# Patient Record
Sex: MALE | Race: WHITE | NOT HISPANIC OR LATINO | URBAN - METROPOLITAN AREA
[De-identification: names, ages, dates, MRNs, and addresses within clinical notes are randomized per-mention and may not be internally consistent; named-entity substitution may affect disease eponyms.]

---

## 2020-01-09 ENCOUNTER — OUTPATIENT (OUTPATIENT)
Dept: OUTPATIENT SERVICES | Facility: HOSPITAL | Age: 70
LOS: 1 days | End: 2020-01-09
Payer: COMMERCIAL

## 2020-01-09 ENCOUNTER — APPOINTMENT (OUTPATIENT)
Dept: RADIOLOGY | Facility: HOSPITAL | Age: 70
End: 2020-01-09
Payer: MEDICARE

## 2020-01-09 DIAGNOSIS — Z00.8 ENCOUNTER FOR OTHER GENERAL EXAMINATION: ICD-10-CM

## 2020-01-09 PROCEDURE — 71046 X-RAY EXAM CHEST 2 VIEWS: CPT | Mod: 26

## 2020-01-09 PROCEDURE — 71046 X-RAY EXAM CHEST 2 VIEWS: CPT

## 2020-05-20 ENCOUNTER — TRANSCRIPTION ENCOUNTER (OUTPATIENT)
Age: 70
End: 2020-05-20

## 2020-08-03 ENCOUNTER — TRANSCRIPTION ENCOUNTER (OUTPATIENT)
Age: 70
End: 2020-08-03

## 2020-09-15 ENCOUNTER — TRANSCRIPTION ENCOUNTER (OUTPATIENT)
Age: 70
End: 2020-09-15

## 2021-04-27 ENCOUNTER — APPOINTMENT (OUTPATIENT)
Dept: NEUROLOGY | Facility: CLINIC | Age: 71
End: 2021-04-27
Payer: COMMERCIAL

## 2021-04-27 VITALS — DIASTOLIC BLOOD PRESSURE: 68 MMHG | SYSTOLIC BLOOD PRESSURE: 116 MMHG | HEART RATE: 78 BPM

## 2021-04-27 DIAGNOSIS — F03.90 UNSPECIFIED DEMENTIA W/OUT BEHAVIORAL DISTURBANCE: ICD-10-CM

## 2021-04-27 DIAGNOSIS — Z82.0 FAMILY HISTORY OF EPILEPSY AND OTHER DISEASES OF THE NERVOUS SYSTEM: ICD-10-CM

## 2021-04-27 DIAGNOSIS — Z72.89 OTHER PROBLEMS RELATED TO LIFESTYLE: ICD-10-CM

## 2021-04-27 DIAGNOSIS — Z78.9 OTHER SPECIFIED HEALTH STATUS: ICD-10-CM

## 2021-04-27 PROCEDURE — 99072 ADDL SUPL MATRL&STAF TM PHE: CPT

## 2021-04-27 PROCEDURE — 99205 OFFICE O/P NEW HI 60 MIN: CPT

## 2021-04-27 NOTE — PHYSICAL EXAM
[FreeTextEntry1] : Head:  Normocephalic Neck: Supple nontender no carotid bruits.  Spine:  Nontender negative straight leg raising.\par \par Mental Status:  Alert Oriented X3 Speech normal and no aphasia or dysarthria.  He scores 30 out of 30 on the Mini-Mental state exam.\par \par Cranial Nerves:  PERRL, Fundi normal Visual Fields full  EOMI no diplopia no ptosis no nystagmus, V through XII intact.\par \par Motor: There bilateral high foot arch is more prominent the left foot and there is mild weakness of left foot dorsiflexion and extensor hallucis.  There is some atrophy of the left anterior tibial muscle group.  There is no dysmetria no fasciculations.\par \par DTRs: Symmetric absent ankle jerks.  Plantars flexor.  No Clonus.\par \par Sensory: Mild decreased pinprick and vibration sense distally in both feet.\par \par Gait: Regular and he is able to walk on his heels and toes.  There is unsteadiness and tandem walking any sways in Romberg.\par

## 2021-04-27 NOTE — REASON FOR VISIT
[Initial Evaluation] : an initial evaluation [FreeTextEntry1] : Numbness and tingling in both feet and family history of Alzheimer's disease.

## 2021-04-27 NOTE — HISTORY OF PRESENT ILLNESS
[FreeTextEntry1] : Mr. Jose Diane is seen for office consultation.  He is a 71-year-old right-handed gentleman who presents with an approximate 6-month history of numbness and tingling in both feet.  Symptoms have become more prominent.  He does have a chronic history of lower back pain he has had episodes of radiculopathy in the lower extremities.  Does not recall having a lumbar MRI.  Is not having any radiating pain into the lower limbs at this time.  He denies motor weakness.  He notices some mild imbalance during the last 6 months.  He denies any bowel or bladder problems.  He is not a diabetic.  He does have nighttime leg cramps.\par \par Also this patient reports a family history of Alzheimer's disease in his father with onset in mid 70s and brother who was diagnosed at age 75.  The patient denies any cognitive problems at this time.\par \par Blood tests have been reviewed in detail from earlier this month.\par \par

## 2021-04-27 NOTE — CONSULT LETTER
[Dear  ___] : Dear  [unfilled], [Consult Letter:] : I had the pleasure of evaluating your patient, [unfilled]. [Please see my note below.] : Please see my note below. [Consult Closing:] : Thank you very much for allowing me to participate in the care of this patient.  If you have any questions, please do not hesitate to contact me. [Sincerely,] : Sincerely, [FreeTextEntry3] : Valerio Castillo MD\par

## 2021-04-27 NOTE — ASSESSMENT
[FreeTextEntry1] : Impression: This patient has a family history of Alzheimer's disease in his father and brother both presenting and there mid 70s.  The patient is having no cognitive issues and his mental status exam performed today is normal.  I spent some time talking to him about  diagnostic work-up that could be done realizing that there is no treatment for Alzheimer's disease in the asymptomatic stages.\par \par Regarding his complaint of numbness in both feet for the last 6 months the etiology may be related to his chronic low back condition.  He does have absent ankle reflexes and he has weakness of the distal left lower extremity with atrophy i acting the small muscles of both feet and the left anterior tibial muscle group.  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  Rule out a peripheral neuropathy.\par \par Recommendations: MRI of brain and lumbar spine, some additional blood tests regarding work-up for peripheral neuropathy, and then EMG and nerve conduction testing of both lower extremities.

## 2021-05-17 ENCOUNTER — OUTPATIENT (OUTPATIENT)
Dept: OUTPATIENT SERVICES | Facility: HOSPITAL | Age: 71
LOS: 1 days | End: 2021-05-17
Payer: COMMERCIAL

## 2021-05-17 ENCOUNTER — RESULT REVIEW (OUTPATIENT)
Age: 71
End: 2021-05-17

## 2021-05-17 ENCOUNTER — APPOINTMENT (OUTPATIENT)
Dept: MRI IMAGING | Facility: HOSPITAL | Age: 71
End: 2021-05-17
Payer: COMMERCIAL

## 2021-05-17 DIAGNOSIS — R20.0 ANESTHESIA OF SKIN: ICD-10-CM

## 2021-05-17 DIAGNOSIS — F03.90 UNSPECIFIED DEMENTIA, UNSPECIFIED SEVERITY, WITHOUT BEHAVIORAL DISTURBANCE, PSYCHOTIC DISTURBANCE, MOOD DISTURBANCE, AND ANXIETY: ICD-10-CM

## 2021-05-17 PROCEDURE — 72148 MRI LUMBAR SPINE W/O DYE: CPT | Mod: 26

## 2021-05-17 PROCEDURE — 70551 MRI BRAIN STEM W/O DYE: CPT | Mod: 26

## 2021-05-17 PROCEDURE — 70551 MRI BRAIN STEM W/O DYE: CPT

## 2021-05-17 PROCEDURE — 72148 MRI LUMBAR SPINE W/O DYE: CPT

## 2021-05-18 ENCOUNTER — NON-APPOINTMENT (OUTPATIENT)
Age: 71
End: 2021-05-18

## 2021-05-28 ENCOUNTER — NON-APPOINTMENT (OUTPATIENT)
Age: 71
End: 2021-05-28

## 2021-06-21 ENCOUNTER — LABORATORY RESULT (OUTPATIENT)
Age: 71
End: 2021-06-21

## 2021-06-22 LAB
B BURGDOR IGG+IGM SER QL IB: NORMAL
FOLATE SERPL-MCNC: >20 NG/ML
RPR SER-TITR: NORMAL
VIT B12 SERPL-MCNC: 1104 PG/ML

## 2021-06-23 LAB — ANA SER IF-ACNC: NEGATIVE

## 2021-06-24 LAB
ALBUMIN MFR SERPL ELPH: 62 %
ALBUMIN SERPL-MCNC: 4.2 G/DL
ALBUMIN/GLOB SERPL: 1.6 RATIO
ALPHA1 GLOB MFR SERPL ELPH: 3 %
ALPHA1 GLOB SERPL ELPH-MCNC: 0.2 G/DL
ALPHA2 GLOB MFR SERPL ELPH: 8.5 %
ALPHA2 GLOB SERPL ELPH-MCNC: 0.6 G/DL
B-GLOBULIN MFR SERPL ELPH: 11.1 %
B-GLOBULIN SERPL ELPH-MCNC: 0.8 G/DL
GAMMA GLOB FLD ELPH-MCNC: 1 G/DL
GAMMA GLOB MFR SERPL ELPH: 15.4 %
INTERPRETATION SERPL IEP-IMP: NORMAL
PROT SERPL-MCNC: 6.8 G/DL
PROT SERPL-MCNC: 6.8 G/DL

## 2021-06-25 ENCOUNTER — NON-APPOINTMENT (OUTPATIENT)
Age: 71
End: 2021-06-25

## 2021-06-29 ENCOUNTER — APPOINTMENT (OUTPATIENT)
Dept: NEUROLOGY | Facility: CLINIC | Age: 71
End: 2021-06-29
Payer: COMMERCIAL

## 2021-06-29 VITALS
HEIGHT: 77 IN | DIASTOLIC BLOOD PRESSURE: 76 MMHG | HEART RATE: 74 BPM | BODY MASS INDEX: 24.21 KG/M2 | WEIGHT: 205 LBS | SYSTOLIC BLOOD PRESSURE: 136 MMHG

## 2021-06-29 VITALS
BODY MASS INDEX: 24.21 KG/M2 | WEIGHT: 205 LBS | HEART RATE: 74 BPM | SYSTOLIC BLOOD PRESSURE: 136 MMHG | DIASTOLIC BLOOD PRESSURE: 76 MMHG | HEIGHT: 77 IN

## 2021-06-29 DIAGNOSIS — M54.5 LOW BACK PAIN: ICD-10-CM

## 2021-06-29 PROCEDURE — 95912 NRV CNDJ TEST 11-12 STUDIES: CPT

## 2021-06-29 PROCEDURE — 95885 MUSC TST DONE W/NERV TST LIM: CPT | Mod: 59

## 2021-06-29 PROCEDURE — 99072 ADDL SUPL MATRL&STAF TM PHE: CPT

## 2021-06-29 PROCEDURE — 99214 OFFICE O/P EST MOD 30 MIN: CPT | Mod: 25

## 2021-06-29 NOTE — ASSESSMENT
[FreeTextEntry1] : Impression: This 71-year-old gentleman has evidence of a sensorimotor peripheral neuropathy based on his history and neurological exam and the significantly abnormal electrodiagnostic study performed today.  The etiology is undetermined.  A series of blood tests are nondiagnostic.  His lumbar MRI reveals some mild degenerative changes and not enough to explain his endings with electrodiagnostic testing and neurologic exam.\par \par Recommendations: He will begin gabapentin 100 mg capsules 1-2 at bedtime as directed.  Dosage will be adjusted according to his response.  Office follow-up in 3 months.\par

## 2021-06-29 NOTE — PROCEDURE
[FreeTextEntry1] : Electrodiagnostic exam including EMG and nerve conduction study was performed of both lower extremities.  Please see the accompanying report.  The study is abnormal and reveals findings consistent with a sensorimotor peripheral neuropathy.

## 2021-06-29 NOTE — HISTORY OF PRESENT ILLNESS
[FreeTextEntry1] : Mr. Diane is seen for an office visit.  He is having numbness and tingling in his feet for the last several months.  Symptoms are worse at night and can be uncomfortable.  Lumbar MRI performed on 5/17/2021 did reveal degenerative changes and mild central canal stenosis at the L4-5 level.  Multiple blood tests recently performed were unremarkable.  He is not having any motor weakness.  No bowel or bladder problems of significance to report.  He does have some nighttime leg cramps.  He has some imbalance.

## 2021-07-22 ENCOUNTER — NON-APPOINTMENT (OUTPATIENT)
Age: 71
End: 2021-07-22

## 2021-09-28 ENCOUNTER — APPOINTMENT (OUTPATIENT)
Dept: NEUROLOGY | Facility: CLINIC | Age: 71
End: 2021-09-28

## 2021-11-14 ENCOUNTER — TRANSCRIPTION ENCOUNTER (OUTPATIENT)
Age: 71
End: 2021-11-14

## 2022-01-20 ENCOUNTER — NON-APPOINTMENT (OUTPATIENT)
Age: 72
End: 2022-01-20

## 2022-01-21 ENCOUNTER — APPOINTMENT (OUTPATIENT)
Dept: ORTHOPEDIC SURGERY | Facility: CLINIC | Age: 72
End: 2022-01-21
Payer: MEDICARE

## 2022-01-21 DIAGNOSIS — M17.12 UNILATERAL PRIMARY OSTEOARTHRITIS, LEFT KNEE: ICD-10-CM

## 2022-01-21 PROCEDURE — 20610 DRAIN/INJ JOINT/BURSA W/O US: CPT | Mod: LT

## 2022-01-21 PROCEDURE — 73564 X-RAY EXAM KNEE 4 OR MORE: CPT | Mod: LT

## 2022-01-21 PROCEDURE — 99203 OFFICE O/P NEW LOW 30 MIN: CPT | Mod: 25

## 2022-01-21 NOTE — CONSULT LETTER
[Dear  ___] : Dear  [unfilled], [FreeTextEntry1] : I had the pleasure of evaluating your patient in the office today for complaints of left knee pain secondary to osteoarthritis. I have enclosed a copy of today's office notes for your charts and for your review.\par \par Sincerely, \par \par Harpreet Fan M.D.\par Professor and \par Department of Orthopedic Surgery\par [facility] [place]\par

## 2022-01-21 NOTE — DISCUSSION/SUMMARY
[de-identified] : 72 y/o male with left knee pain secondary to osteoarthritis.   A lengthy discussion was held regarding the patients condition and treatment options including all risks, benefits, prognosis and outcomes of each were discussed in detail. The patient would like to pursue conservative management at this time. Non-operative treatment was advised and a knee treatment handout was given and reviewed with the patient. Discussed medial  knee brace to help alleviate symptoms, if patient wishes to try this we will provide prescription at a later date. Steroid injection provided today. The patient will contact me if there are any concerns.  Follow up will be prn. The patient expressed understanding and all questions were answered.

## 2022-01-21 NOTE — PHYSICAL EXAM
[LE] : Sensory: Intact in bilateral lower extremities [Knee] : patellar 2+ and symmetric bilaterally [de-identified] : 72 y/o pleasant male in NAD and AAOx3. 24BMI. The pt has a mildly antalgic gait. Physical examination of both knees reveals normal contours, skin intact with no signs of infection, no erythema, no swelling, no effusion, no distal lymphedema or phlebitis, no patholaxity. ROM of the left knee reveals -7°-125° Strength is 5/5 within this arc of motion. There is no pain on palpation to the medial/lateral joint line. Patient noted to have genu varum. There are no neurological deficits.\par 2+ DP/PT pulses [de-identified] : X-rays were ordered, obtained, and interpreted by me today: 4 views of the left knee demonstrate tricompartmental arthritis, with no acute fracture or dislocation.\par

## 2022-01-21 NOTE — HISTORY OF PRESENT ILLNESS
[de-identified] : 70 y/o male with history of left knee arthroscopic surgery in 1979 by Dr. Simmons presents with left knee pain since August. He denies any injury or trauma. He just started working in a place that has lots of stairs and notice that his knee is starting to get worse. The pain is aggravated while driving for 30 mints. The pain is not keeping him up at night. Rest for a few days does seem to help. He states the pain a 4/10 at this visit and can get to an 8/10. He does take Aleve, Advil and Voltaren gel, which does not really help. He denies any numbness or tingling. \par \par Hx: HTN, Three cardio Alblation - 81 ASA, Anxiety, Cholesterol.

## 2022-01-21 NOTE — PROCEDURE
[de-identified] : After careful discussion with the patient regarding the risks versus benefits of a corticosteroid and local anesthetic injection, the patient has decided to proceed ahead with the treatment. After sterile preparation of the site, an injection has been given into the left knee using 8 cc of half percent Marcaine without epinephrine and 2 cc of betamethasone. The site was cleaned and a band-aid was applied. The patient tolerated the injection without complication\par

## 2022-03-25 RX ORDER — GABAPENTIN 300 MG/1
300 CAPSULE ORAL
Qty: 60 | Refills: 5 | Status: ACTIVE | COMMUNITY
Start: 2021-06-29 | End: 1900-01-01

## 2022-05-04 ENCOUNTER — APPOINTMENT (OUTPATIENT)
Dept: NEUROLOGY | Facility: CLINIC | Age: 72
End: 2022-05-04
Payer: MEDICARE

## 2022-05-04 VITALS
HEART RATE: 76 BPM | DIASTOLIC BLOOD PRESSURE: 80 MMHG | SYSTOLIC BLOOD PRESSURE: 134 MMHG | HEIGHT: 77 IN | WEIGHT: 205 LBS | BODY MASS INDEX: 24.21 KG/M2

## 2022-05-04 PROCEDURE — 99215 OFFICE O/P EST HI 40 MIN: CPT

## 2022-05-04 RX ORDER — PREGABALIN 75 MG/1
75 CAPSULE ORAL
Qty: 60 | Refills: 0 | Status: ACTIVE | COMMUNITY
Start: 2022-05-04 | End: 1900-01-01

## 2022-05-04 NOTE — HISTORY OF PRESENT ILLNESS
[FreeTextEntry1] : Jose Diane is seen for an office visit with respect to his peripheral neuropathy.  He continues to have numbness and tingling in both feet which will occasionally extend approximately in the lower limbs but symptoms are maximal distally.  He is having occasional numbness involving the fingertips of both hands.  He has been taking gabapentin 300 mg capsules up to 3 at night without improvement.  He also takes Lexapro 10 mg daily.  His other medications are unchanged.  He is not having any motor weakness.  He notices some mild imbalance of gait.

## 2022-05-04 NOTE — ASSESSMENT
[FreeTextEntry1] : Impression: This 72-year-old male patient has findings most consistent with a peripheral neuropathy.  This is based on neurological exam electrodiagnostic testing.  Blood tests have been nondiagnostic.  Lumbar MRI reveals some mild degenerative change.  Gabapentin has basically been ineffective.\par \par Recommendations: Trial of discontinuing gabapentin.  Begin Lyrica 75 mg 1 to 2 capsules daily as directed.  Patient will be followed as directed.  Consider switching from Lexapro to Cymbalta.\par

## 2022-05-04 NOTE — PHYSICAL EXAM
[FreeTextEntry1] : Head:  Normocephalic Neck: Supple nontender no carotid bruits.  Spine:  Nontender negative straight leg raising.\par \par Mental Status:  Alert Oriented X3 Speech normal and no aphasia or dysarthria.\par \par Cranial Nerves:  PERRL,  Visual Fields full  EOMI no diplopia no ptosis no nystagmus, V through XII intact.\par \par Motor: Bilateral high foot arches.  Today he is fairly good strength in both feet.  Decreased muscle bulk less prominent left anterior muscle group than previously observed.  No dysmetria or fasciculation.\par \par DTRs: Symmetric diminished at the knees absent at the ankles..  Plantars flexor.  No Clonus.\par \par Sensory: Decreased pinprick vibration distally in both feet unchanged.\par \par Gait: Regular though he has some unsteadiness with tandem walking and he sways in Romberg position.  These findings are not new.\par

## 2022-06-01 ENCOUNTER — NON-APPOINTMENT (OUTPATIENT)
Age: 72
End: 2022-06-01

## 2022-06-27 ENCOUNTER — APPOINTMENT (OUTPATIENT)
Dept: ORTHOPEDIC SURGERY | Facility: CLINIC | Age: 72
End: 2022-06-27

## 2022-06-27 VITALS
BODY MASS INDEX: 23.62 KG/M2 | WEIGHT: 200 LBS | DIASTOLIC BLOOD PRESSURE: 76 MMHG | SYSTOLIC BLOOD PRESSURE: 140 MMHG | HEIGHT: 77 IN

## 2022-06-27 DIAGNOSIS — M16.12 UNILATERAL PRIMARY OSTEOARTHRITIS, LEFT HIP: ICD-10-CM

## 2022-06-27 PROCEDURE — 73502 X-RAY EXAM HIP UNI 2-3 VIEWS: CPT

## 2022-06-27 PROCEDURE — 99214 OFFICE O/P EST MOD 30 MIN: CPT

## 2022-06-27 RX ORDER — DICLOFENAC SODIUM 50 MG/1
50 TABLET, DELAYED RELEASE ORAL
Qty: 20 | Refills: 0 | Status: ACTIVE | COMMUNITY
Start: 2022-06-27 | End: 1900-01-01

## 2022-07-11 ENCOUNTER — NON-APPOINTMENT (OUTPATIENT)
Age: 72
End: 2022-07-11

## 2022-07-15 ENCOUNTER — APPOINTMENT (OUTPATIENT)
Dept: NEUROLOGY | Facility: CLINIC | Age: 72
End: 2022-07-15

## 2022-07-15 VITALS
SYSTOLIC BLOOD PRESSURE: 136 MMHG | HEIGHT: 77 IN | BODY MASS INDEX: 23.62 KG/M2 | WEIGHT: 200 LBS | HEART RATE: 80 BPM | DIASTOLIC BLOOD PRESSURE: 82 MMHG

## 2022-07-15 DIAGNOSIS — R20.0 ANESTHESIA OF SKIN: ICD-10-CM

## 2022-07-15 DIAGNOSIS — G62.9 POLYNEUROPATHY, UNSPECIFIED: ICD-10-CM

## 2022-07-15 PROCEDURE — 99214 OFFICE O/P EST MOD 30 MIN: CPT

## 2022-07-15 RX ORDER — ZOLPIDEM TARTRATE 10 MG/1
10 TABLET ORAL
Qty: 30 | Refills: 0 | Status: ACTIVE | COMMUNITY
Start: 2022-07-10

## 2022-07-15 RX ORDER — ESCITALOPRAM OXALATE 10 MG/1
10 TABLET ORAL
Qty: 90 | Refills: 0 | Status: ACTIVE | COMMUNITY
Start: 2022-06-01

## 2022-07-15 RX ORDER — ROSUVASTATIN CALCIUM 10 MG/1
10 TABLET, FILM COATED ORAL
Qty: 90 | Refills: 0 | Status: ACTIVE | COMMUNITY
Start: 2022-05-16

## 2022-07-15 RX ORDER — MELOXICAM 7.5 MG/1
7.5 TABLET ORAL
Qty: 40 | Refills: 0 | Status: ACTIVE | COMMUNITY
Start: 2022-04-05

## 2022-07-15 RX ORDER — DICLOFENAC POTASSIUM 50 MG/1
50 TABLET, COATED ORAL
Qty: 60 | Refills: 0 | Status: ACTIVE | COMMUNITY
Start: 2022-03-09

## 2022-07-15 RX ORDER — HYDROCODONE BITARTRATE AND ACETAMINOPHEN 7.5; 325 MG/1; MG/1
7.5-325 TABLET ORAL
Qty: 42 | Refills: 0 | Status: ACTIVE | COMMUNITY
Start: 2022-07-01

## 2022-07-15 NOTE — ASSESSMENT
[FreeTextEntry1] : Impression: This 72-year-old male patient has presentation most consistent with sensory peripheral neuropathy.  Lumbar MRI did reveal some mild degenerative change.  Gabapentin and Lyrica been ineffective and he did have side effects these medications have been stopped.\par \par Recommendations: Consider switching from Lexapro 10 mg to duloxetine 30 mg daily.  This will be deferred until he returns from summer vacation.  Office follow-up in 3 months or else as needed.\par

## 2022-07-15 NOTE — PHYSICAL EXAM
[FreeTextEntry1] : Head:  Normocephalic Neck: Supple nontender no carotid bruits.  Spine:  Nontender negative straight leg raising.\par \par Mental Status:  Alert Oriented X3 Speech normal and no aphasia or dysarthria.\par \par Cranial Nerves:  PERRL,  Visual Fields full  EOMI no diplopia no ptosis no nystagmus, V through XII intact.\par \par Motor:  No drift, normal strength tone and coordination and no focal atrophy. No abnormal movements. No dysmetria.  Normal rapid alternating movements. \par \par DTRs: Symmetric 1+ absent ankle reflexes.  Plantars flexor.  No Clonus.\par \par Sensory: Decreased vibration distally in the lowers.\par \par Gait: Regular though favors left lower extremity with hip and knee arthritis.  Mild unsteadiness with tandem walking unchanged.  Negative Romberg.\par

## 2022-07-15 NOTE — HISTORY OF PRESENT ILLNESS
[FreeTextEntry1] : Jose Diane seen for an office visit with respect to his peripheral neuropathy.  He did try Lyrica but he developed side effects including imbalance and some cognitive issues and stopped the medication and it was no benefit in his neuropathic symptomatology.  He had also tried gabapentin which has been stopped and was ineffective.  He continues to have some numbness involving his feet with tingling.  He has developed arthritis of his left hip and knee for which she has had cortisone injection and has been prescribed diclofenac..  He has a mild unsteadiness of gait which is chronic.  He did have some numbness involving the fingertips of both hands which has decreased.  He has chronic low back pain which has previously been evaluated with MRI.\par \par

## 2022-07-15 NOTE — END OF VISIT
Medical Necessity Clause: This procedure was medically necessary because the lesion that was treated was: Anesthesia Volume In Cc: 1.5 Billing Type: Third-Party Bill Wound Care: Vaseline [Time Spent: ___ minutes] : I have spent [unfilled] minutes of time on the encounter. Bill 96265 For Specimen Handling/Conveyance To Laboratory?: no Hemostasis: Aluminum Chloride Biopsy Method: Dermablade Render Post-Care Instructions In Note?: yes Medical Necessity Information: It is in your best interest to select a reason for this procedure from the list below. All of these items fulfill various CMS LCD requirements except the new and changing color options. Path Notes (To The Dermatopathologist): Please check margins. Anesthesia Type: 1% lidocaine with epinephrine Consent was obtained from the patient. The risks and benefits to therapy were discussed in detail. Specifically, the risks of infection, scarring, bleeding, prolonged wound healing, incomplete removal, allergy to anesthesia, nerve injury and recurrence were addressed. Prior to the procedure, the treatment site was clearly identified and confirmed by the patient. All components of Universal Protocol/PAUSE Rule completed. Detail Level: Simple Size Of Lesion In Cm (Required): 0.9 Notification Instructions: Patient will be notified of biopsy results. However, patient instructed to call the office if not contacted within 2 weeks. Post-Care Instructions: I reviewed with the patient in detail post-care instructions. Patient is to keep the biopsy site dry overnight, and then apply vaseline twice daily until healed.

## 2022-07-21 ENCOUNTER — RESULT REVIEW (OUTPATIENT)
Age: 72
End: 2022-07-21

## 2022-07-21 ENCOUNTER — APPOINTMENT (OUTPATIENT)
Dept: RADIOLOGY | Facility: CLINIC | Age: 72
End: 2022-07-21

## 2022-07-21 ENCOUNTER — OUTPATIENT (OUTPATIENT)
Dept: OUTPATIENT SERVICES | Facility: HOSPITAL | Age: 72
LOS: 1 days | End: 2022-07-21
Payer: MEDICARE

## 2022-07-21 DIAGNOSIS — Z00.8 ENCOUNTER FOR OTHER GENERAL EXAMINATION: ICD-10-CM

## 2022-07-21 DIAGNOSIS — M16.12 UNILATERAL PRIMARY OSTEOARTHRITIS, LEFT HIP: ICD-10-CM

## 2022-07-21 PROCEDURE — 73525 CONTRAST X-RAY OF HIP: CPT

## 2022-07-21 PROCEDURE — 27093 INJECTION FOR HIP X-RAY: CPT

## 2022-07-21 PROCEDURE — 27093 INJECTION FOR HIP X-RAY: CPT | Mod: LT

## 2022-07-21 PROCEDURE — 73525 CONTRAST X-RAY OF HIP: CPT | Mod: 26,LT

## 2022-10-11 ENCOUNTER — NON-APPOINTMENT (OUTPATIENT)
Age: 72
End: 2022-10-11

## 2022-10-17 ENCOUNTER — APPOINTMENT (OUTPATIENT)
Dept: NEUROLOGY | Facility: CLINIC | Age: 72
End: 2022-10-17

## 2023-07-24 RX ORDER — DULOXETINE HYDROCHLORIDE 60 MG/1
60 CAPSULE, DELAYED RELEASE PELLETS ORAL
Qty: 90 | Refills: 2 | Status: ACTIVE | COMMUNITY
Start: 2022-09-23 | End: 1900-01-01

## 2024-11-18 NOTE — REASON FOR VISIT
[Follow-Up: _____] : a [unfilled] follow-up visit [FreeTextEntry1] : Numbness and tingling in both feet for the last several months no